# Patient Record
Sex: FEMALE | Race: ASIAN | NOT HISPANIC OR LATINO
[De-identification: names, ages, dates, MRNs, and addresses within clinical notes are randomized per-mention and may not be internally consistent; named-entity substitution may affect disease eponyms.]

---

## 2021-09-20 ENCOUNTER — TRANSCRIPTION ENCOUNTER (OUTPATIENT)
Age: 51
End: 2021-09-20

## 2022-11-07 PROBLEM — Z00.00 ENCOUNTER FOR PREVENTIVE HEALTH EXAMINATION: Status: ACTIVE | Noted: 2022-11-07

## 2022-11-08 ENCOUNTER — NON-APPOINTMENT (OUTPATIENT)
Age: 52
End: 2022-11-08

## 2022-11-08 ENCOUNTER — APPOINTMENT (OUTPATIENT)
Dept: GASTROENTEROLOGY | Facility: CLINIC | Age: 52
End: 2022-11-08

## 2022-11-08 VITALS
DIASTOLIC BLOOD PRESSURE: 80 MMHG | HEART RATE: 95 BPM | HEIGHT: 61 IN | OXYGEN SATURATION: 99 % | SYSTOLIC BLOOD PRESSURE: 110 MMHG | RESPIRATION RATE: 12 BRPM | WEIGHT: 145 LBS | BODY MASS INDEX: 27.38 KG/M2 | TEMPERATURE: 98.5 F

## 2022-11-08 DIAGNOSIS — E66.3 OVERWEIGHT: ICD-10-CM

## 2022-11-08 DIAGNOSIS — D64.9 ANEMIA, UNSPECIFIED: ICD-10-CM

## 2022-11-08 DIAGNOSIS — Z78.9 OTHER SPECIFIED HEALTH STATUS: ICD-10-CM

## 2022-11-08 PROCEDURE — 99204 OFFICE O/P NEW MOD 45 MIN: CPT

## 2022-11-08 NOTE — PHYSICAL EXAM

## 2022-11-08 NOTE — ASSESSMENT
[FreeTextEntry1] : My impression is that of a female with a chronic mild anemia which apparently is unchanged with a history of a negative fecal DNA test who is otherwise asymptomatic.  Her main GI issue is her weight control.\par \par I have spent a great deal of time discussing the role of daily high-intensity exercise with the patient. We discussed behavior modification strategies to institute this habit.   I have discussed nutrition in great detail including consuming vegetable fibers on a daily basis and limiting simple carbohydrates 5 days per week.  We have also reviewed the benefits of soluble fiber supplementation, including (but not limited to), favorable effects on lipid profile, weight control and the salutary effects on colonic microbiota. We reviewed the effects of such daily habits on metabolism and the metabolic set point resulting in a healthy weight, decreased pain sensitivity (effecting the GI tract), bowel habits and other aspects of health.\par \par And given her a fecal occult blood test to repeat.  Assuming this is negative, I do not see a compelling reason to pursue endoscopic evaluation.\par \par Patient is to follow-up, as needed.  If she becomes more anemic, iron deficient or symptomatic she is to contact me right away.  If her Hemoccult is positive we should consider endoscopic evaluation.

## 2022-11-08 NOTE — HISTORY OF PRESENT ILLNESS
[FreeTextEntry1] : The patient is here to discuss recently discovered mild anemia.  She states she has been mildly anemic her entire life and has had 2 Cologuard test which were negative.  She had some mild heartburn a year ago when she was eating late with spicy food but since changing her dietary habits she no longer has any heartburn, odynophagia, dysphagia or satiety.  She struggles her with weight control.  She moves her bowels on a regular basis.  She denies any rectal bleeding.